# Patient Record
Sex: MALE | Race: WHITE
[De-identification: names, ages, dates, MRNs, and addresses within clinical notes are randomized per-mention and may not be internally consistent; named-entity substitution may affect disease eponyms.]

---

## 2020-01-02 ENCOUNTER — HOSPITAL ENCOUNTER (EMERGENCY)
Dept: HOSPITAL 62 - ER | Age: 60
Discharge: HOME | End: 2020-01-02
Payer: COMMERCIAL

## 2020-01-02 VITALS — DIASTOLIC BLOOD PRESSURE: 85 MMHG | SYSTOLIC BLOOD PRESSURE: 126 MMHG
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
## 2020-01-02 DIAGNOSIS — S81.812A: ICD-10-CM

## 2020-01-02 DIAGNOSIS — S71.112A: Primary | ICD-10-CM

## 2020-01-02 DIAGNOSIS — I25.10: ICD-10-CM

## 2020-01-02 DIAGNOSIS — I10: ICD-10-CM

## 2020-01-02 PROCEDURE — 90471 IMMUNIZATION ADMIN: CPT

## 2020-01-02 PROCEDURE — 12032 INTMD RPR S/A/T/EXT 2.6-7.5: CPT

## 2020-01-02 PROCEDURE — 99282 EMERGENCY DEPT VISIT SF MDM: CPT

## 2020-01-02 PROCEDURE — 90715 TDAP VACCINE 7 YRS/> IM: CPT

## 2020-01-02 NOTE — ER DOCUMENT REPORT
HPI





- HPI


Patient complains to provider of: lac


Time Seen by Provider: 01/02/20 11:36


Onset: This afternoon


Onset/Duration: Sudden


Pain Level: Denies


Context: 





Patient accidentally cut his left leg with a  brush.  Patient with 

laceration to medial aspect of left thigh.  Patient denies any significant 

tenderness at this time.


Associated Symptoms: Other - Leg laceration


Relieved by: Denies


Similar symptoms previously: No


Recently seen / treated by doctor: No





- ROS


ROS below otherwise negative: Yes


Systems Reviewed and Negative: Yes All other systems reviewed and negative





- NEURO


Neurology: DENIES: Weakness





- GASTROINTESTINAL


Gastrointestinal: DENIES: Nausea, Patient vomiting





- DERM


Skin Color: Normal


Skin Problems: Laceration





Past Medical History





- General


Information source: Patient





- Social History


Smoking Status: Never Smoker


Frequency of alcohol use: None


Drug Abuse: None


Occupation: Self-employed


Lives with: Spouse/Significant other


Family History: Reviewed & Not Pertinent


Patient has suicidal ideation: No


Patient has homicidal ideation: No





- Past Medical History


Cardiac Medical History: Reports: Hx Coronary Artery Disease, Hx Hypertension


Past Surgical History: Reports: Hx Cardiac Surgery





Vertical Provider Document





- CONSTITUTIONAL


Agree With Documented VS: Yes


Exam Limitations: No Limitations


General Appearance: WD/WN, No Apparent Distress





- INFECTION CONTROL


TRAVEL OUTSIDE OF THE [place] IN LAST 30 DAYS: No





- HEENT


HEENT: Atraumatic, Normocephalic





- NECK


Neck: Normal Inspection, Supple





- RESPIRATORY


Respiratory: No Respiratory Distress





- MUSCULOSKELETAL/EXTREMETIES


Musculoskeletal/Extremeties: MAEW, FROM, Tender - Mild tenderness to left medial

thigh with overlying laceration





- NEURO


Level of Consciousness: Awake, Alert, Appropriate


Motor/Sensory: No Motor Deficit





- DERM


Integumentary: Warm, Dry, Laceration - 5 x 1.5 cm laceration to left medial 

thigh, no active bleeding





Course





- Re-evaluation


Re-evalutation: 





01/02/20


Patient encouraged to return in 2 days for wound recheck given concern about 

contamination of the wound.  A drain was placed in the wound as well.  Patient 

educated on signs of infection that he should return immediately for.  Patient 

verbalized understanding is agreeable with discharge plan of care at this time.





- Vital Signs


Vital signs: 


                                        











Temp Pulse Resp BP Pulse Ox


 


 97.7 F   81   16   150/90 H  97 


 


 01/02/20 11:39  01/02/20 11:39  01/02/20 11:39  01/02/20 11:39  01/02/20 11:39














Procedures





- Laceration/Wound Repair


  ** Left Thigh


Wound length (cm): 5


Wound's Depth, Shape: Linear, Irregular


Laceration pre-procedure: Shur-Clens applied


Anesthetic type: 1% Lidocaine w/epi


Volume Anesthetic (mLs): 4


Wound explored: Contaminated, Foreign body removed


Irrigated w/ Saline (mLs): 1,200


Wound Debrided: Minimal


Wound Repaired With: Sutures


Suture Size/Type: Vicryl, Nylon


Number of Sutures: 7 - nylon


Layer Closure?: Yes


Deep Layer Suture Size/Type: 4:0


Number Deep Layer Sutures: 4


Post-procedure wound care: Sterile dressing applied


Post-procedure NV exam normal: Yes


Complications: No





Discharge





- Discharge


Clinical Impression: 


Laceration of left leg


Qualifiers:


 Encounter type: initial encounter Qualified Code(s): S81.812A - Laceration wit

hout foreign body, left lower leg, initial encounter





Condition: Stable


Disposition: HOME, SELF-CARE


Instructions:  Laceration Care ([facility]), Prophylactic Antibiotic ([facility]), Tetanus 

Immunization Given ([facility])


Additional Instructions: 


Return immediately for any new or worsening symptoms: Fever, red streaks, 

purulent drainage, increased pain or any concerning symptoms





Followup with your primary care provider, call tomorrow to make a followup 

appointment





Return in 2 days for wound recheck





Keep wound covered as it continues to heal


Prescriptions: 


Amox Tr/Potassium Clavulanate [Augmentin 875-125 Tablet] 1 tab PO BID 10 Days  

tablet

## 2020-01-02 NOTE — ER DOCUMENT REPORT
ED Medical Screen (RME)





- General


Chief Complaint: Laceration


Stated Complaint: LACERATION


Time Seen by Provider: 01/02/20 11:36


TRAVEL OUTSIDE OF THE [place] IN LAST 30 DAYS: No





- HPI


Notes: 





01/02/20 11:38


Patient is a 59-year-old male with history of coronary artery disease with 

unknown last tetanus who presents complaining of laceration to his left distal 

medial leg by a  prior to arrival.  Patient states that the bleeding has 

been well controlled.  He is able to ambulate and move his leg without 

difficulty, but does have some discomfort to the area.





I have treated and performed a rapid initial assessment of this patient.  A 

comprehensive ED assessment and evaluation of the patient, analysis of test 

results and completion of medical decision making process will be conducted by 

additional ED providers.





PHYSICAL EXAMINATION:





GENERAL: Well-appearing, well-nourished and in no acute distress.  A&Ox4.  

Answers questions appropriately.


Left proximal distal thigh: There is an irregular 4 to 5 cm laceration noted 

that is somewhat deep as well without obvious arterial bleed noted.

## 2020-01-05 ENCOUNTER — HOSPITAL ENCOUNTER (EMERGENCY)
Dept: HOSPITAL 62 - ER | Age: 60
Discharge: HOME | End: 2020-01-05
Payer: COMMERCIAL

## 2020-01-05 VITALS — SYSTOLIC BLOOD PRESSURE: 146 MMHG | DIASTOLIC BLOOD PRESSURE: 93 MMHG

## 2020-01-05 DIAGNOSIS — S81.812D: ICD-10-CM

## 2020-01-05 DIAGNOSIS — L03.116: ICD-10-CM
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
## 2020-01-05 DIAGNOSIS — Z48.03: Primary | ICD-10-CM

## 2020-01-05 LAB
ADD MANUAL DIFF: NO
ALBUMIN SERPL-MCNC: 4.5 G/DL (ref 3.5–5)
ALP SERPL-CCNC: 61 U/L (ref 38–126)
ANION GAP SERPL CALC-SCNC: 8 MMOL/L (ref 5–19)
AST SERPL-CCNC: 26 U/L (ref 17–59)
BASOPHILS # BLD AUTO: 0 10^3/UL (ref 0–0.2)
BASOPHILS NFR BLD AUTO: 0.7 % (ref 0–2)
BILIRUB DIRECT SERPL-MCNC: 0.2 MG/DL (ref 0–0.4)
BILIRUB SERPL-MCNC: 0.6 MG/DL (ref 0.2–1.3)
BUN SERPL-MCNC: 16 MG/DL (ref 7–20)
CALCIUM: 9.8 MG/DL (ref 8.4–10.2)
CHLORIDE SERPL-SCNC: 102 MMOL/L (ref 98–107)
CO2 SERPL-SCNC: 31 MMOL/L (ref 22–30)
EOSINOPHIL # BLD AUTO: 0.2 10^3/UL (ref 0–0.6)
EOSINOPHIL NFR BLD AUTO: 3.1 % (ref 0–6)
ERYTHROCYTE [DISTWIDTH] IN BLOOD BY AUTOMATED COUNT: 13.9 % (ref 11.5–14)
GLUCOSE SERPL-MCNC: 82 MG/DL (ref 75–110)
HCT VFR BLD CALC: 44.8 % (ref 37.9–51)
HGB BLD-MCNC: 15.3 G/DL (ref 13.5–17)
LYMPHOCYTES # BLD AUTO: 1.5 10^3/UL (ref 0.5–4.7)
LYMPHOCYTES NFR BLD AUTO: 23.2 % (ref 13–45)
MCH RBC QN AUTO: 30.4 PG (ref 27–33.4)
MCHC RBC AUTO-ENTMCNC: 34.2 G/DL (ref 32–36)
MCV RBC AUTO: 89 FL (ref 80–97)
MONOCYTES # BLD AUTO: 0.6 10^3/UL (ref 0.1–1.4)
MONOCYTES NFR BLD AUTO: 9.3 % (ref 3–13)
NEUTROPHILS # BLD AUTO: 4.2 10^3/UL (ref 1.7–8.2)
NEUTS SEG NFR BLD AUTO: 63.7 % (ref 42–78)
PLATELET # BLD: 257 10^3/UL (ref 150–450)
POTASSIUM SERPL-SCNC: 4.3 MMOL/L (ref 3.6–5)
PROT SERPL-MCNC: 7.1 G/DL (ref 6.3–8.2)
RBC # BLD AUTO: 5.04 10^6/UL (ref 4.35–5.55)
TOTAL CELLS COUNTED % (AUTO): 100 %
WBC # BLD AUTO: 6.5 10^3/UL (ref 4–10.5)

## 2020-01-05 PROCEDURE — 85025 COMPLETE CBC W/AUTO DIFF WBC: CPT

## 2020-01-05 PROCEDURE — 99283 EMERGENCY DEPT VISIT LOW MDM: CPT

## 2020-01-05 PROCEDURE — 96365 THER/PROPH/DIAG IV INF INIT: CPT

## 2020-01-05 PROCEDURE — 36415 COLL VENOUS BLD VENIPUNCTURE: CPT

## 2020-01-05 PROCEDURE — 73701 CT LOWER EXTREMITY W/DYE: CPT

## 2020-01-05 PROCEDURE — 80053 COMPREHEN METABOLIC PANEL: CPT

## 2020-01-05 PROCEDURE — 87040 BLOOD CULTURE FOR BACTERIA: CPT

## 2020-01-05 NOTE — ER DOCUMENT REPORT
HPI





- HPI


Context: 





Patient is a 5-year-old male who presents to the emergency department for a 

recheck of his laceration.  3 days ago he cut his left leg with a  

brush.  A drain was placed to the area.  Denies any fevers, body aches, chills, 

or any other symptoms.





<LUCY PAN - Last Filed: 01/05/20 11:36>





- HPI


Patient complains to provider of: Left medial thigh cellulitis after his 

kicked back cutting in skin


Onset: Other - 2 days ago


Onset/Duration: Worse


Quality of pain: Achy


Severity: Mild


Pain Level: 1


Associated Symptoms: None


Exacerbated by: Movement


Relieved by: Denies


Similar symptoms previously: No


Recently seen / treated by doctor: Yes - Lucy BLACKWELL initially saw this 

patient and ordered labs and CT 





- ROS


ROS below otherwise negative: Yes





- DERM


Skin Color: Erythema - cellulitis around injured left medial thigh.





<DAISY MCBRIDE JR - Last Filed: 01/05/20 16:37>





- HPI


Time Seen by Provider: 01/05/20 11:32





Past Medical History





- Social History


Family History: Reviewed & Not Pertinent





- Past Medical History


Cardiac Medical History: Reports: Hx Coronary Artery Disease, Hx Hypertension


Past Surgical History: Reports: Hx Cardiac Surgery





<LUCY PAN - Last Filed: 01/05/20 11:36>





- General


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 

station when the  was using kicked back  and abraded left medial thigh 

through his pants with some paint particles with the brush as well.  And was 

doing well after it was washed and cleaned but then today the left thigh 

appeared to have some severe erythema and cellulitis and he came to the hospital

further evaluation.





- Social History


Smoking Status: Unknown if Ever Smoked


Cigarette use (# per day): No


Chew tobacco use (# tins/day): No


Smoking Education Provided: No


Frequency of alcohol use: None


Drug Abuse: None


Patient has suicidal ideation: No


Patient has homicidal ideation: No





- Medical History


Medical History: Negative





<DAISY MCBRIDE JR - Last Filed: 01/05/20 16:37>





Vertical Provider Document





- INFECTION CONTROL


TRAVEL OUTSIDE OF THE [place] IN LAST 30 DAYS: No





<LUCY PAN - Last Filed: 01/05/20 11:36>





Course





- Vital Signs


Vital signs: 


                                        











Temp Pulse Resp BP Pulse Ox


 


 97.7 F   77   16   138/90 H  95 


 


 01/05/20 10:47  01/05/20 10:47  01/05/20 10:47  01/05/20 10:47  01/05/20 10:47














<LUCY PAN - Last Filed: 01/05/20 11:36>





- Vital Signs


Vital signs: 


                                        











Temp Pulse Resp BP Pulse Ox


 


 97.7 F   77   16   138/90 H  95 


 


 01/05/20 10:47  01/05/20 10:47  01/05/20 10:47  01/05/20 10:47  01/05/20 10:47














- Laboratory


Result Diagrams: 


                                 01/05/20 12:25





                                 01/05/20 12:25


Laboratory results interpreted by me: 


                                        











  01/05/20





  12:25


 


Carbon Dioxide  31 H














- Diagnostic Test


Radiology reviewed: Pending





<YUDAISY NEAL JR - Last Filed: 01/05/20 16:37>





Procedures





- Additional Procedures


  ** IO insertion


Time performed: 16:12


Additional Procedures: Other - drain removal after Betadine swab and cleansing 

done by myself; wound appears to be healing well with erythema around the wound 

itself around 1 cm diameter.. Initial ink pen marking approximately 20 cm in 

diameter were evident.  No obvious erythema to this marking was noted.  Wound 

appears to be improving according to wife and patient





<DAISY MCBRIDE JR - Last Filed: 01/05/20 16:37>





Discharge





<LUCY PAN - Last Filed: 01/05/20 11:36>





- Discharge


Admitting Provider: Personal doctor and may follow-up here in the ER in 2 days





<DAISY MCBRIDE JR - Last Filed: 01/05/20 16:37>





- Discharge


Clinical Impression: 


 Wound cellulitis, Change or removal of drains





Condition: Fair


Disposition: HOME, SELF-CARE


Additional Instructions: 


Follow-up with personal doctor in 2 days keep wound clean and dry sutures out as

directed; may return here in 2 days for reinspection of wound.  Eat yogurt daily

because you are on both Levaquin and clindamycin antibiotics; this will help 

reestablish bacteria that are good for you in your intestine


Prescriptions: 


Mupirocin [Bactroban 2% Ointment 22 gm] 1 applic NASL HSP PRN #1 tube


 PRN Reason: Congestion


Clindamycin HCl [Cleocin 150 mg Capsule] 150 mg PO BID #14 capsule


Levofloxacin [Levaquin 750 mg Tablet] 500 mg PO DAILY #5 tablet


Referrals: 


RAHEEL HORTON MD [Primary Care Provider] - Follow up as needed

## 2020-01-05 NOTE — ER DOCUMENT REPORT
ED Medical Screen (RME)





- General


Chief Complaint: Suture Recheck


Stated Complaint: SUTURE CHECK


Time Seen by Provider: 01/05/20 11:32


Primary Care Provider: 


RAHEEL HORTON MD [Primary Care Provider] - Follow up as needed


Notes: 





Patient is a 5-year-old male who presents to the emergency department for a 

recheck of his laceration.  3 days ago he cut his left leg with a  

brush.  A drain was placed to the area.  Denies any fevers, body aches, chills, 

or any other symptoms.





Patient was originally seen in triage and slotted for supra track area.  When I 

assessed him, there was significant cellulitis to the area.  Patient is being 

upgraded and he will have clindamycin, blood cultures, basic labs, and a CT of 

the left lower extremity.





Exam: Edema noted around wound.  Outlined with marker.





I have greeted and performed a rapid initial assessment of this patient.  A 

comprehensive ED assessment and evaluation of the patient, analysis of test 

results and completion of medical decision making process will be conducted by 

an additional ED providers.








TRAVEL OUTSIDE OF THE [place] IN LAST 30 DAYS: No





- Related Data


Allergies/Adverse Reactions: 


                                        





No Known Allergies Allergy (Verified 01/02/20 11:46)


   











Past Medical History





- Social History


Frequency of alcohol use: None


Drug Abuse: None





- Past Medical History


Cardiac Medical History: Reports: Hx Coronary Artery Disease, Hx Hypertension


Past Surgical History: Reports: Hx Cardiac Surgery





Physical Exam





- Vital signs


Vitals: 


                                        











Temp Pulse Resp BP Pulse Ox


 


 97.7 F   77   16   138/90 H  95 


 


 01/05/20 10:47  01/05/20 10:47  01/05/20 10:47  01/05/20 10:47  01/05/20 10:47














Course





- Vital Signs


Vital signs: 


                                        











Temp Pulse Resp BP Pulse Ox


 


 97.7 F   77   16   138/90 H  95 


 


 01/05/20 10:47  01/05/20 10:47  01/05/20 10:47  01/05/20 10:47  01/05/20 10:47














Doctor's Discharge





- Discharge


Referrals: 


RAHEEL HORTON MD [Primary Care Provider] - Follow up as needed

## 2020-01-05 NOTE — ER DOCUMENT REPORT
ED General





- General


Chief Complaint: Suture Recheck


Stated Complaint: SUTURE CHECK


Time Seen by Provider: 01/05/20 11:32


Primary Care Provider: 


RAHEEL HORTON MD [Primary Care Provider] - Follow up as needed


TRAVEL OUTSIDE OF THE [place] IN LAST 30 DAYS: No





- HPI


Onset: Other - Patient Name: PAZ ISAACAbrazo [facility] Campusedical Record Number: 

[id number] YOB: 1960Patient Status: Emergency Emergency 

Provider: DAISY MCBRIDE JRAccount Number: [id number] Date: 01/05/20 

11:36Initialization Date: 01/05/20 11:36   HPI  - HPI Context:   Patient is a 

5-year-old male who presents to the emergency department for a recheck of his 

laceration.  3 days ago he cut his left leg with a  brush.  A drain 

was placed to the area.  Denies any fevers, body aches, chills, or any other 

symptoms.  <LUCY RAJAN - Last Filed: 01/05/20 11:36>  - HPI Patient comp

lains to provider of: Left medial thigh cellulitis after his  kicked back

cutting in skin Onset: Other - 2 days ago Onset/Duration: Worse Quality of pain:

Achy Severity: Mild Pain Level: 1 Associated Symptoms: None Exacerbated by: 

Movement Relieved by: Denies Similar symptoms previously: No Recently seen / 

treated by doctor: Yes - Lucy rajan FNP initially saw this patient and 

ordered labs and CT   - ROS ROS below otherwise negative: Yes  - DERM Skin 

Color: Erythema - cellulitis around injured left medial thigh.  <DAISY MCBRIDE JR - Last Filed: 01/05/20 16:37>  - HPI Time Seen by Provider: 01/05/20 11:32 

Past Medical History  - Social History Family History: Reviewed & Not Pertinent 

- Past Medical History Cardiac Medical History: Reports: Hx Coronary Artery 

Disease, Hx Hypertension Past Surgical History: Reports: Hx Cardiac Surgery  

<LUCY RAJAN - Last Filed: 01/05/20 11:36>  - General Information source: 

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

rinder was using kicked back  and abraded left medial thigh through his pants 

with some paint particles with the brush as well.  And was doing well after it 

was washed and cleaned but then today the left thigh appeared to have some 

severe erythema and cellulitis and he came to the hospital further evaluation.  

- Social History Smoking Status: Unknown if Ever Smoked Cigarette use (# per 

day): No Chew tobacco use (# tins/day): No Smoking Education Provided: No 

Frequency of alcohol use: None Drug Abuse: None Patient has suicidal ideation: 

No Patient has homicidal ideation: No  - Medical History Medical History: 

Negative  <DAISY MCBRIDE JR - Last Filed: 01/05/20 16:37>  Vertical Provider

Document  - INFECTION CONTROL TRAVEL OUTSIDE OF THE [place] IN LAST 30 DAYS: No  

<LUCY RAJAN - Last Filed: 01/05/20 11:36>  Course  - Vital Signs Vital 

signs:    TempPulseRespBPPulse Ox  97.7 F  77  16  138/90 H 95   01/05/20 10:47 

01/05/20 10:47 01/05/20 10:47 01/05/20 10:47 01/05/20 10:47    <LUCY RAJAN - Last Filed: 01/05/20 11:36>  - Vital Signs Vital signs:    

TempPulseRespBPPulse Ox  97.7 F  77  16  138/90 H 95   01/05/20 10:47 01/05/20 

10:47 01/05/20 10:47 01/05/20 10:47 01/05/20 10:47    - Laboratory Result 

Diagrams:  01/05/20 12:25 [Image 0]  01/05/20 12:25 [Image 1] Laboratory results

interpreted by me:     01/05/20  12:25 Carbon Dioxide 31 H    - Diagnostic Test 

Radiology reviewed: Pending  <DAISY MCBRIDE JR - Last Filed: 01/05/20 16:37>

 Procedures  - Additional Procedures   ** IO insertion Time performed: 16:12 

Additional Procedures: Other - drain removal after Betadine swab and cleansing 

done by myself; wound appears to be healing well with erythema around the wound 

itself around 1 cm diameter.. Initial ink pen marking approximately 20 cm in 

diameter were evident.  No obvious erythema to this marking was noted.  Wound 

appears to be improving according to wife and patient  <DAISY MCBRIDE JR - 

Last Filed: 01/05/20 16:37>  Discharge  <LUCY RAJAN - Last Filed: 

01/05/20 11:36>  - Discharge Admitting Provider: Personal doctor and may follow-

up here in the ER in 2 days  <DAISY MCBRIDE JR - Last Filed: 01/05/20 16:37>

 - Discharge Clinical Impression:   Wound cellulitis, Change or removal of 

drains  Condition: Fair Disposition: HOME, SELF-CARE Additional Instructions:  

Follow-up with personal doctor in 2 days keep wound clean and dry sutures out as

directed; may return here in 2 days for reinspection of wound.  Eat yogurt daily

because you are on both Levaquin and clindamycin antibiotics; this will help 

reestablish bacteria that are good for you in your intestine Prescriptions:  

Mupirocin [Bactroban 2% Ointment 22 gm] 1 applic NASL HSP PRN #1 tube  PRN 

Reason: Congestion Clindamycin HCl [Cleocin 150 mg Capsule] 150 mg PO BID #14 ca

psule Levofloxacin [Levaquin 750 mg Tablet] 500 mg PO DAILY #5 tablet Referrals:

 RAHEEL HORTON MD [Primary Care Provider] - Follow up as needed





- Related Data


Allergies/Adverse Reactions: 


                                        





No Known Allergies Allergy (Verified 01/02/20 11:46)


   











Past Medical History





- General


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 

station when the  was using kicked back  and abraded left medial thigh 

through his pants with some paint particles with the brush as well.  And was 

doing well after it was washed and cleaned but then today the left thigh 

appeared to have some severe erythema and cellulitis and he came to the hospital

further evaluation.





- Social History


Smoking Status: Unknown if Ever Smoked


Cigarette use (# per day): No


Chew tobacco use (# tins/day): No


Frequency of alcohol use: None


Drug Abuse: None


Family History: Reviewed & Not Pertinent


Patient has suicidal ideation: No


Patient has homicidal ideation: No





- Medical History


Medical History: Negative





- Past Medical History


Cardiac Medical History: Reports: Hx Coronary Artery Disease, Hx Hypertension


Past Surgical History: Reports: Hx Cardiac Surgery





Review of Systems





- Review of Systems


Constitutional: No symptoms reported


EENT: No symptoms reported


Cardiovascular: No symptoms reported


Respiratory: No symptoms reported


Gastrointestinal: No symptoms reported


Genitourinary: No symptoms reported


Male Genitourinary: No symptoms reported


Musculoskeletal: No symptoms reported


Skin: Change in color, Other - Patient appears to be clean and dry and 

approximately 5 cm in length.  Drain was removed by myself


Hematologic/Lymphatic: No symptoms reported


Neurological/Psychological: No symptoms reported





Physical Exam





- Vital signs


Vitals: 


                                        











Temp Pulse Resp BP Pulse Ox


 


 97.7 F   77   16   138/90 H  95 


 


 01/05/20 10:47  01/05/20 10:47  01/05/20 10:47  01/05/20 10:47  01/05/20 10:47














- Skin


Skin Temperature: Warm


Skin Color: Erythema, Other - Mild erythema around surrounding wound linear 

purple marking around 20 cm diameter had no edema or erythema sutures appear to 

be clean and dry





Course





- Vital Signs


Vital signs: 


                                        











Temp Pulse Resp BP Pulse Ox


 


 97.6 F   69   18   146/93 H  97 


 


 01/05/20 15:45  01/05/20 15:45  01/05/20 15:45  01/05/20 15:45  01/05/20 15:45














- Laboratory


Result Diagrams: 


                                 01/05/20 12:25





                                 01/05/20 12:25


Laboratory results interpreted by me: 


                                        











  01/05/20





  12:25


 


Carbon Dioxide  31 H














Procedures





- Additional Procedures


  ** IO insertion


Time performed: 16:41


Additional Procedures: Other -  note patient had drain removed on my initial 

note





Discharge





- Discharge


Clinical Impression: 


 Wound cellulitis, Change or removal of drains





Condition: Fair


Disposition: HOME, SELF-CARE


Additional Instructions: 


Follow-up with personal doctor in 2 days keep wound clean and dry sutures out as

directed; may return here in 2 days for reinspection of wound.  Eat yogurt daily

because you are on both Levaquin and clindamycin antibiotics; this will help 

reestablish bacteria that are good for you in your intestine


Prescriptions: 


Mupirocin [Bactroban 2% Ointment 22 gm] 1 applic NASL HSP PRN #1 tube


 PRN Reason: Congestion


Clindamycin HCl [Cleocin 150 mg Capsule] 150 mg PO BID #14 capsule


Levofloxacin [Levaquin 750 mg Tablet] 500 mg PO DAILY #5 tablet


Referrals: 


RAHEEL HORTON MD [Primary Care Provider] - Follow up as needed

## 2020-01-05 NOTE — RADIOLOGY REPORT (SQ)
EXAM DESCRIPTION:  CT LEFT LOWER EXTREMITY WITH



COMPLETED DATE/TIME:  1/5/2020 2:29 pm



REASON FOR STUDY:  eval possible abcess; previous laceration



COMPARISON:  None.



TECHNIQUE:  CT scan of the left thigh performed from the lesser trochanter of the femur through the m
id lower leg following the uncomplicated intravenous administration of 50 mL Omnipaque 350 iodinated 
contrast IV.  Images reviewed with soft tissue and bone windows.  Reconstructed coronal and sagittal 
MPR images reviewed.  All images stored on PACS.

All CT scanners at this facility use dose modulation, iterative reconstruction, and/or weight based d
osing when appropriate to reduce radiation dose to as low as reasonably achievable (ALARA).

[facility]: Dose Right  [facility]: CareDose    [facility]: Dose Right    [facility]: Teradose 4D    [facility]: Smart Technologies



RADIATION DOSE:  CT Rad equipment meets quality standard of care and radiation dose reduction techniq
ues were employed. CTDIvol: 5.4 mGy. DLP: 318 mGy-cm. mGy.



LIMITATIONS:  None.



FINDINGS:  OSSEOUS STRUCTURES: No acute fracture. No worrisome bone lesions.

SOFT TISSUES: There is a soft tissue wound of the medial left thigh containing a surgical drain with 
adjacent superficial soft tissue edema (series 4, image 88).  No discrete fluid collection is identif
ied.

OTHER: No other significant finding.



IMPRESSION:  There is a soft tissue wound of the medial left thigh containing a surgical drain with a
djacent superficial soft tissue edema (series 4, image 88).  No discrete fluid collection is identifi
ed.



TECHNICAL DOCUMENTATION:  JOB ID:  [id number]

Quality ID # [id number]: Final reports with documentation of one or more dose reduction techniques (e.g., Au
tomated exposure control, adjustment of the mA and/or kV according to patient size, use of iterative 
reconstruction technique)

 2011 [employer]- All Rights Reserved



Reading location - IP/workstation name: [email or web address]